# Patient Record
(demographics unavailable — no encounter records)

---

## 2024-10-17 NOTE — REASON FOR VISIT
[CV Risk Factors and Non-Cardiac Disease] : CV risk factors and non-cardiac disease [Follow-Up - Clinic] : a clinic follow-up of [Dyspnea] : dyspnea [Hyperlipidemia] : hyperlipidemia [Hypertension] : hypertension [Palpitations] : palpitations [FreeTextEntry1] : This is a 66 year year old female here today for follow up cardiac followup evaluation. The patient had COVID-19 infection September 26 after going on a river cruise.  She still complaining of nonproductive cough and some shortness of breath. She has a past medical history significant for hypertension, palpitations, status post recent knee injury.  She remains on Amlodipine 5mg PO DAILY, Rosuvastatin 20mg PO DAILY, Fenofibrate 160mg PO DAILY, Vascepa, Labetalol 200mg PO BID, and Telmisartan HCTZ 80/25mg PO DAILY. She denies fever, chills, weight loss, malaise, rash, alteration bowel habits, weakness, abdominal  pain, bloating, changes in urination, visual disturbances, chest pain, headaches, dizziness, heart palpitations, recent episodes of syncope or falls at this time.

## 2024-10-17 NOTE — DISCUSSION/SUMMARY
[FreeTextEntry1] : This is a 66 year-old female with past medical history significant for status post COVID-19 infection September 26, 2024 after completing a river cruise hypertension, palpitations, status post recent knee injury, who comes in for followup cardiac evaluation.  She is still complaining of shortness of breath, loss of taste and smell.  She denies chest pain, dizziness or syncope. Electrocardiogram done October 17, 2024 demonstrated normal sinus rhythm rate 74 bpm is otherwise remarkable for poor R wave progression and nonspecific ST wave changes. Chest x-ray done October 4, 2024 demonstrated no evidence for pneumonia. Blood work done Tober 15 2024 demonstrated white blood cell count of 6.9. I recommend the patient make an appoint with pulmonary medicine to evaluate her shortness of breath and rule out postinflammatory COVID-19 effects. She is instructed follow-up with her primary care physician. She is currently hemodynamically stable and asymptomatic from cardiac standpoint. She is considering abdominoplasty and January 2024.  Blood work done August 2, 2023 demonstrated an LDL direct 59 mg/dL, AST of 71, ALT of 88,.  Hemoglobin A1c of 5.8 the patient reports that she had blood work done after having some alcoholic beverages. The patient had a normal exercise stress test August 19, 2021. Echo Doppler examination done July 21, 2023 demonstrated mild mitral valve regurgitation, mild tricuspid valve regurgitation, mild left ventricular hypertrophy, thinning of the interatrial septum, left atrial enlargement and normal ejection fraction of 64%. The patient will follow-up blood work in 2 months.  I have instructed her to limit her alcohol intake. She will follow-up with her primary care physician to discuss that in greater detail. She will have new blood work for lipid panel and SMA-20 later today. The patient is stable from cardiac standpoint. She will limit her ETOH intake and new blood work was done today.  PMH: She was recently seen in the hospital emergency room with an elevated blood pressure of over 170 mm.  She then realized she was not taking her medications on a regular basis.  She now understands when she should be taking her medications.  She is currently on Micardis hydrochlorothiazide 80/25 mg in the morning, amlodipine 5 mg in the morning, Bystolic 10 mg in the evening, in addition to her Crestor 20 mg per day. Electrocardiogram done September November 20, 2019 demonstrated normal sinus rhythm rate 70 beats per minute is otherwise unremarkable. Electrocardiogram done 1/2/2019,-- normal sinus rhythm at a rate of 66 beats per minute is otherwise remarkable for left atrial abnormality.   The patient understands that aerobic exercises must be increased to 40 minutes 4 times per week. A detailed discussion of lifestyle modification was done today. The patient has a good understanding of the diagnosis, and treatment plan. Lifestyle modification was also outlined.

## 2024-10-17 NOTE — PHYSICAL EXAM
[Well Developed] : well developed [Well Nourished] : well nourished [No Acute Distress] : no acute distress [Normal Venous Pressure] : normal venous pressure [No Carotid Bruit] : no carotid bruit [Normal S1, S2] : normal S1, S2 [No Murmur] : no murmur [No Rub] : no rub [Clear Lung Fields] : clear lung fields [Good Air Entry] : good air entry [No Respiratory Distress] : no respiratory distress  [Soft] : abdomen soft [Non Tender] : non-tender [No Masses/organomegaly] : no masses/organomegaly [Normal Bowel Sounds] : normal bowel sounds [Normal Gait] : normal gait [No Edema] : no edema [No Cyanosis] : no cyanosis [No Clubbing] : no clubbing [No Varicosities] : no varicosities [No Rash] : no rash [No Skin Lesions] : no skin lesions [Moves all extremities] : moves all extremities [No Focal Deficits] : no focal deficits [Normal Speech] : normal speech [Alert and Oriented] : alert and oriented [Normal memory] : normal memory [General Appearance - Well Developed] : well developed [Normal Appearance] : normal appearance [Well Groomed] : well groomed [General Appearance - Well Nourished] : well nourished [No Deformities] : no deformities [General Appearance - In No Acute Distress] : no acute distress [Normal Conjunctiva] : the conjunctiva exhibited no abnormalities [Normal Oral Mucosa] : normal oral mucosa [Normal Jugular Venous A Waves Present] : normal jugular venous A waves present [Normal Jugular Venous V Waves Present] : normal jugular venous V waves present [No Jugular Venous Simpson A Waves] : no jugular venous simpson A waves [Respiration, Rhythm And Depth] : normal respiratory rhythm and effort [Exaggerated Use Of Accessory Muscles For Inspiration] : no accessory muscle use [Auscultation Breath Sounds / Voice Sounds] : lungs were clear to auscultation bilaterally [Bowel Sounds] : normal bowel sounds [Abdomen Soft] : soft [Abnormal Walk] : normal gait [Nail Clubbing] : no clubbing of the fingernails [Cyanosis, Localized] : no localized cyanosis [Skin Color & Pigmentation] : normal skin color and pigmentation [Skin Turgor] : normal skin turgor [] : no rash [Oriented To Time, Place, And Person] : oriented to person, place, and time [Impaired Insight] : insight and judgment were intact [Affect] : the affect was normal [Mood] : the mood was normal [No Anxiety] : not feeling anxious [5th Left ICS - MCL] : palpated at the 5th LICS in the midclavicular line [Normal] : normal [No Precordial Heave] : no precordial heave was noted [Normal Rate] : normal [Rhythm Regular] : regular [Normal S1] : normal S1 [Normal S2] : normal S2 [No Gallop] : no gallop heard [I] : a grade 1 [2+] : left 2+ [No Abnormalities] : the abdominal aorta was not enlarged and no bruit was heard [No Pitting Edema] : no pitting edema present [Apical Thrill] : no thrill palpable at the apex [S3] : no S3 [S4] : no S4 [Click] : no click [Pericardial Rub] : no pericardial rub

## 2024-10-17 NOTE — ASSESSMENT
[FreeTextEntry1] : This is a 64 year year old female here today for follow up cardiac followup evaluation.  She has a past medical history significant for  hypertension, palpitations, status post recent knee injury.  Today she is feeling generally well and does not have any complaints at this time.  She remains on Amlodipine 5mg PO DAILY, Rosuvastatin 20mg PO DAILY, Fenofibrate 160mg PO DAILY, Vascepa, Labetalol 200mg PO BID, and Telmisartan HCTZ 80/25mg PO DAILY.  She would like to make a note that she did forget to take her medication this morning and she also just came back from [place]   She denies fever, chills, weight loss, malaise, rash, alteration bowel habits, weakness, abdominal  pain, bloating, changes in urination, visual disturbances, chest pain, headaches, dizziness, heart palpitations, recent episodes of syncope or falls at this time.  BLOOD PRESSURE: -BP is borderline elevated on today's exam but she did not take her medication today.  -I have discussed the importance of maintaining good BP control and reviewed the newest guidelines with the patient while re-enforcing dietary sodium restrictions to no more than 2-3 g daily, DASH diet, life style modifications as well as the goal of maintaining ideal body weight with the patient today. I have advised the patient to avoid the use of over-the-counter medications/ supplements especially NSAIDS.  I have reviewed with Ms. GARCIA that serious health consequences can occur when blood pressure is not well controlled and the need for strict compliance with medication and that optimal control can significantly reduce the risk of cardiovascular disease stroke, heart attack and other organ damage. They verbally expressed understanding of the fore mentioned serious health consequences to me today.  BLOOD WORK: -New blood work was done today, 05/23/2022 to evaluate lipid profile, CBC, BMP, hepatic function, A1C and TSH.  CHOLESTEROL CONTROL: -Patient will continue the advised  TLC diet and to continue follow-up for treatment of hyperlipidemia and repeat blood testing with diet and exercise. I have discussed different exercises and the importance of maintenance of optimal body weight. The importance of staying within guidelines and recommendations was stressed to the patient today and they acknowledged that they understand this to me verbally.   -Ms. GARCIA was educated and advised that failure to follow-up with my medical recommendations to lower cholesterol can result in severe health consequences therefore, they will continuing a low saturated and low fat diet and to avoid excessive carbohydrates to help reduce triglycerides and that lowering LDL levels is associated with a significant decrease in serious cardiac events including but not limited to heart attack stroke and overall death. We will continue lipid lowering agents as advised based on blood test results and the patient understands to call if they develop severe muscle discomfort or if they have a reddish tinted discoloration in their urine.  TESTING/REPORTS: -EKG done May 23, 2022 which demonstrated regular sinus rhythm with nonspecific ST-T wave changes, BPM of 69.  -Calcium score done on Rahat 3 2020 demonstrated 55.   -EKG done 2/26/2021 which demonstrated regular sinus rhythm with nonspecific ST-T wave changes, BPM of 63  -Echocardiogram done 8/19/22 demonstrated mild TR and OH with thinning and dyskinesis of the inter-atrial septum with normal left ventricular systolic function. EF of 67%.   PLAN: -She will continue with her usual medications and will contact the office if she is having any complaints between now and their next follow up appointment. -She promises to be more compliant with her medications. -New blood work was done today, 05/23/2022  to evaluate lipid profile, CBC, BMP, hepatic function, A1C and TSH.   I have discussed the plan of care with Ms. AME GARCIA  and she  will follow up in 3 months.   The patient understands that aerobic exercises must be increased to minutes 4 times/week and a detailed discussion of lifestyle modification was done today.  The patient has a good understanding of the diagnosis, treatment plan and lifestyle modification.  She will contact me at the office for any questions with their care or any changes in their health status.   Michael WAHL

## 2024-11-26 NOTE — HISTORY OF PRESENT ILLNESS
[Former] : former [TextBox_4] : 1111 66-year-old patient Chief complain shortness of breath exertional dyspnea She states she is status post COVID-19 infection  past September 2024 while on a trip overseas in [place] She has had prior shortness of breath but states since the COVID infection it had progressed significantly Although she does state that she has had  very recently some clinical improvement She is also felt that she has had some shortness of breath at rest with walking  no wheeze No purulent sputum No history of COPD asthma bronchitis emphysema interstitial lung [id number] History obstructive sleep apnea with an oral appliance She states a prior chest x-ray in October was clear There is no pleuritic chest pain Positive extensive tobacco history 50-year pack history of close discontinued all tobacco since 2004 [TextBox_11] : 2 1/2 [YearQuit] : 2004 [TextBox_29] : 50 year pack history with tobacco x  20   years

## 2024-11-26 NOTE — DISCUSSION/SUMMARY
[FreeTextEntry1] : Shortness of breath/exertional shortness of breath Risk factors for pulmonary embolism including post COVID and post overseas flying to [place] rule out pulmonary embolism although low clinical suspicion Status post COVID-19 infection Obstructive sleep apnea on oral appliance Borderline NIOX Pulmonary physiology otherwise normal No evidence for exercise-induced hypoxemia Chest x-ray imaging unremarkable Await D-dimer and renal function If D-dimer is significantly elevated CT chest angiogram protocol Otherwise discussed with patient noncontrast CT chest to make sure were not missing any underlying COVID induced pulmonary parenchymal disease Rule out more subtle interstitial lung disease in a patient with connective tissue disorder rheumatoid arthritis

## 2024-11-26 NOTE — HISTORY OF PRESENT ILLNESS
[Former] : former [TextBox_4] : 1111 66-year-old patient Chief complain shortness of breath exertional dyspnea She states she is status post COVID-19 infection  past September 2024 while on a trip overseas in [place] She has had prior shortness of breath but states since the COVID infection it had progressed significantly Although she does state that she has had  very recently some clinical improvement She is also felt that she has had some shortness of breath at rest with walking  no wheeze No purulent sputum No history of COPD asthma bronchitis emphysema interstitial lung [id number] History obstructive sleep apnea with an oral appliance She states a prior chest x-ray in October was clear There is no pleuritic chest pain Positive extensive tobacco history 50-year pack history of close discontinued all tobacco since 2004 [TextBox_11] : 2 1/2 [YearQuit] : 2004 [TextBox_29] : 50 year pack history with tobacco x  20   years

## 2024-11-26 NOTE — PROCEDURE
[FreeTextEntry1] : Blood draw  Pulmonary 6 low exercise study November 20 50,024 Baseline O2 saturation 96% Initially went right up to 91% but throughout the rest of the ambulatory study O2 saturation on room air 93 to 94% with normal recovery  Spirometry November 25, 2024 Flow rates are normal No bronchodilator sponsor FEV1 Lung volumes normal No air trapping Diffusion low normal range 73% predicted Hemoglobin 14.9  Chest x-ray PA lateral November 25, 2024 Normal cardiac size Lung fields are grossly clear without parenchymal infiltrate pleural effusion dominant pulmonary nodule Soft tissue bony structures unremarkable Overall impression clear lungs  NIOX 23 upper limits of normal range November 25, 2024

## 2024-12-30 NOTE — HISTORY OF PRESENT ILLNESS
[Former] : former [TextBox_4] : 66-year-old patient Chief complain shortness of breath exertional dyspnea now  post trip [place]  still dyspnea  pos  air line  flight  She states she is status post COVID-19 infection  past September 2024 while on a trip overseas in [place] She has had prior shortness of breath but states since the COVID infection it had progressed significantly Although she does state that she has had  very recently some clinical improvement She is also felt that she has had some shortness of breath at rest with walking  no wheeze No purulent sputum No history of COPD asthma bronchitis emphysema interstitial lung [id number] History obstructive sleep apnea with an oral appliance She states a prior chest x-ray in October was clear There is no pleuritic chest pain Positive extensive tobacco history 50-year pack history of close discontinued all tobacco since 2004 [TextBox_11] : 2 1/2 [YearQuit] : 2004 [TextBox_29] : 50 year pack history with tobacco x  20   years

## 2024-12-30 NOTE — DISCUSSION/SUMMARY
[FreeTextEntry1] : Shortness of breath/exertional shortness of breath Favor  overweight  and deconditioning  Risk factors for pulmonary embolism including post COVID and post overseas flying to [place] rule out pulmonary embolism although low clinical suspicion D Dimer  negative CT  pos CA Ca++- f/u Dr Stratton  ?? ischemic  etiology Status post COVID-19 infection Obstructive sleep apnea on oral appliance Borderline NIOX Pulmonary physiology otherwise normal No evidence for exercise-induced hypoxemia Chest x-ray imaging unremarkable Await D-dimer and renal function If D-dimer is significantly elevated CT chest angiogram protocol Otherwise discussed with patient noncontrast CT chest to make sure were not missing any underlying COVID induced pulmonary parenchymal disease Rule out more subtle interstitial lung disease in a patient with connective tissue disorder rheumatoid arthritis neg on CT CHEST  JEAN CLAUDE on  oral appliance

## 2024-12-30 NOTE — PROCEDURE
[FreeTextEntry1] : blood draw  CT chest 12/10/2024 Lungs clear Punctate calcified granuloma right lower lobe Pleural mediastinum negative It is atherosclerotic calcification of coronary artery vessels Small hiatal hernia Overall impression no acute pleural or parenchymal pulmonary abnormalities Recommend routine cardiology follow-up   Pulmonary 6 low exercise study November 20 50,024 Baseline O2 saturation 96% Initially went right up to 91% but throughout the rest of the ambulatory study O2 saturation on room air 93 to 94% with normal recovery  Spirometry November 25, 2024 Flow rates are normal No bronchodilator sponsor FEV1 Lung volumes normal No air trapping Diffusion low normal range 73% predicted Hemoglobin 14.9  Chest x-ray PA lateral November 25, 2024 Normal cardiac size Lung fields are grossly clear without parenchymal infiltrate pleural effusion dominant pulmonary nodule Soft tissue bony structures unremarkable Overall impression clear lungs  NIOX 23 upper limits of normal range November 25, 2024   Interpreting Physician: Fernando Stratton MD Primary Sonographer:    Israel Avelar  Indication(s):     Essential (primary) hypertension - I10; Other forms of dyspnea - R06.09; Cardiac murmur, unspecified - R01.1; Palpitations - R00.2 Procedure:         Transthoracic echocardiogram with 2-D, M-mode and complete spectral and color flow Doppler. Ordering Location: [facility] Admission Status:  Outpatient Study Information: Image quality for this study is good.  _______________________________________________________________________________________ CONCLUSIONS:  1. The left ventricular systolic function is normal with an ejection fraction of 64 % by visual interpretation visually estimated at 60 to 65 %. 2. The left atrium is mildly dilated in size. 3. Mild mitral regurgitation. 4. Mild tricuspid regurgitation. 5. Mild left ventricular hypertrophy. 6. Thinning of the inter-atrial septum.

## 2025-02-14 NOTE — DISCUSSION/SUMMARY
[FreeTextEntry1] : Shortness of breath/exertional shortness of breath Status post plastic surgery abdominoplasty required hospitalization fourth hypoxemia without interval improvement Hospital course as noted above Underwent CT angiogram no evidence for pulmonary embolism Cardiac evaluation along with elevated BNP did not feel there was any evidence of cardiac disease accounting for this CT angiogram protocol did not demonstrate bilateral mid to lower lung zone atelectasis which can account for hypoxemia. Pulmonary 6-minute walk exercise study did not demonstrate desaturation She is also status post RSV bronchiolitis Likely these multiple factors contributing to the hypoxemia Will reevaluate patient 1 month Additional workup can include a nuclear shunt study ordered for nuclear medicine Will also consider cardiopulmonary exercise stress test Ordered high-altitude simulation test as patient is planning to fly Repeat CT chest with 1 month follow-up Follow-up cardiology  Favor  overweight  and deconditioning  Risk factors for pulmonary embolism including post COVID and post overseas flying to [facility] rule out pulmonary embolism although low clinical suspicion D Dimer  negative CT  pos CA Ca++- f/u Dr Stratton  ?? ischemic  etiology Status post COVID-19 infection Obstructive sleep apnea on oral appliance Borderline NIOX has normalized Pulmonary physiology otherwise normal No evidence for exercise-induced hypoxemia Chest x-ray imaging unremarkable Await D-dimer and renal function If D-dimer is significantly elevated CT chest angiogram protocol Otherwise discussed with patient noncontrast CT chest to make sure were not missing any underlying COVID induced pulmonary parenchymal disease Rule out more subtle interstitial lung disease in a patient with connective tissue disorder rheumatoid arthritis neg on CT CHEST  JEAN CLAUDE on  oral appliance

## 2025-02-14 NOTE — REVIEW OF SYSTEMS
[Negative] : Hematologic [TextBox_30] : HPI [TextBox_44] : Hyperlipidemia, heart murmur [TextBox_69] : History of hiatal hernia [TextBox_94] : Osteoarthritis, rheumatoid arthritis

## 2025-02-14 NOTE — PROCEDURE
[FreeTextEntry1] : PFT February 14, 2025 Flow rates normal range FVC 76% predicted Total lung capacity normal range 82% predicted No air trapping Diffusion normal range 81% predicted Hemoglobin 15.8 Noted data comparison to study of 11/25/2024 there is similar reduction at the FVC and total lung capacity with no interval change at the diffusion  NIOX 18 normal range with no evidence for airway inflammation February 14, 2025  Pulmonary 6-minute walk exercise study February 14, 2025 Baseline room air O2 saturation 94% Normal study Patient does have low normal oxygen levels 90 to 93% during the procedure At recovery she was 95% No evidence of exercise-induced hypoxemia No indication based on this study for portable oxygen therapy  Chest x-ray PA lateral February 14, 2025 Heart size is normal Mild elevation right hemidiaphragm Lung fields are clear No parenchymal infiltrate pleural effusion or dominant pulmonary nodule Soft tissue bony structures unremarkable No appreciable pleural effusion There is some very minimal atelectatic change in the right lower base compared to the chest x-ray 11/25/2024  CT chest 12/10/2024 Lungs clear Punctate calcified granuloma right lower lobe Pleural mediastinum negative It is atherosclerotic calcification of coronary artery vessels Small hiatal hernia Overall impression no acute pleural or parenchymal pulmonary abnormalities Recommend routine cardiology follow-up   Pulmonary 6 low exercise study November 20 50,024 Baseline O2 saturation 96% Initially went right up to 91% but throughout the rest of the ambulatory study O2 saturation on room air 93 to 94% with normal recovery  Spirometry November 25, 2024 Flow rates are normal No bronchodilator sponsor FEV1 Lung volumes normal No air trapping Diffusion low normal range 73% predicted Hemoglobin 14.9  Chest x-ray PA lateral November 25, 2024 Normal cardiac size Lung fields are grossly clear without parenchymal infiltrate pleural effusion dominant pulmonary nodule Soft tissue bony structures unremarkable Overall impression clear lungs  NIOX 23 upper limits of normal range November 25, 2024   Interpreting Physician: Fernando Stratton MD Primary Sonographer:    Israel Avelar  Indication(s):     Essential (primary) hypertension - I10; Other forms of dyspnea - R06.09; Cardiac murmur, unspecified - R01.1; Palpitations - R00.2 Procedure:         Transthoracic echocardiogram with 2-D, M-mode and complete spectral and color flow Doppler. Ordering Location: [facility] Admission Status:  Outpatient Study Information: Image quality for this study is good.  _______________________________________________________________________________________ CONCLUSIONS:  1. The left ventricular systolic function is normal with an ejection fraction of 64 % by visual interpretation visually estimated at 60 to 65 %. 2. The left atrium is mildly dilated in size. 3. Mild mitral regurgitation. 4. Mild tricuspid regurgitation. 5. Mild left ventricular hypertrophy. 6. Thinning of the inter-atrial septum.

## 2025-03-05 NOTE — PHYSICAL EXAM
[Well Developed] : well developed [Well Nourished] : well nourished [No Acute Distress] : no acute distress [Normal Venous Pressure] : normal venous pressure [No Carotid Bruit] : no carotid bruit [Normal S1, S2] : normal S1, S2 [No Murmur] : no murmur [No Rub] : no rub [Clear Lung Fields] : clear lung fields [Good Air Entry] : good air entry [No Respiratory Distress] : no respiratory distress  [Soft] : abdomen soft [Non Tender] : non-tender [No Masses/organomegaly] : no masses/organomegaly [Normal Bowel Sounds] : normal bowel sounds [Normal Gait] : normal gait [No Edema] : no edema [No Cyanosis] : no cyanosis [No Clubbing] : no clubbing [No Varicosities] : no varicosities [No Rash] : no rash [No Skin Lesions] : no skin lesions [Moves all extremities] : moves all extremities [No Focal Deficits] : no focal deficits [Normal Speech] : normal speech [Alert and Oriented] : alert and oriented [Normal memory] : normal memory [Normal Appearance] : normal appearance [General Appearance - Well Developed] : well developed [Well Groomed] : well groomed [General Appearance - Well Nourished] : well nourished [No Deformities] : no deformities [General Appearance - In No Acute Distress] : no acute distress [Normal Conjunctiva] : the conjunctiva exhibited no abnormalities [Normal Oral Mucosa] : normal oral mucosa [Normal Jugular Venous A Waves Present] : normal jugular venous A waves present [Normal Jugular Venous V Waves Present] : normal jugular venous V waves present [No Jugular Venous Simpson A Waves] : no jugular venous simpson A waves [Respiration, Rhythm And Depth] : normal respiratory rhythm and effort [Exaggerated Use Of Accessory Muscles For Inspiration] : no accessory muscle use [Auscultation Breath Sounds / Voice Sounds] : lungs were clear to auscultation bilaterally [Bowel Sounds] : normal bowel sounds [Abdomen Soft] : soft [Nail Clubbing] : no clubbing of the fingernails [Abnormal Walk] : normal gait [Cyanosis, Localized] : no localized cyanosis [Skin Color & Pigmentation] : normal skin color and pigmentation [Skin Turgor] : normal skin turgor [] : no rash [Impaired Insight] : insight and judgment were intact [Oriented To Time, Place, And Person] : oriented to person, place, and time [Affect] : the affect was normal [Mood] : the mood was normal [No Anxiety] : not feeling anxious [5th Left ICS - MCL] : palpated at the 5th LICS in the midclavicular line [Normal] : normal [No Precordial Heave] : no precordial heave was noted [Apical Thrill] : no thrill palpable at the apex [Normal Rate] : normal [Rhythm Regular] : regular [Normal S1] : normal S1 [Normal S2] : normal S2 [No Gallop] : no gallop heard [S3] : no S3 [S4] : no S4 [Click] : no click [Pericardial Rub] : no pericardial rub [I] : a grade 1 [No Abnormalities] : the abdominal aorta was not enlarged and no bruit was heard [2+] : left 2+ [No Pitting Edema] : no pitting edema present

## 2025-03-05 NOTE — REASON FOR VISIT
[CV Risk Factors and Non-Cardiac Disease] : CV risk factors and non-cardiac disease [FreeTextEntry1] : This is a 66 year year old female here today for follow up cardiac followup evaluation. The patient had COVID-19 infection September 26 after going on a river cruise.  She still complaining of nonproductive cough and some shortness of breath. She has a past medical history significant for hypertension, palpitations, status post recent knee injury.  She remains on Amlodipine 5mg PO DAILY, Rosuvastatin 20mg PO DAILY, Fenofibrate 160mg PO DAILY, Vascepa, Labetalol 200mg PO BID, and Telmisartan HCTZ 80/25mg PO DAILY. She denies fever, chills, weight loss, malaise, rash, alteration bowel habits, weakness, abdominal  pain, bloating, changes in urination, visual disturbances, chest pain, headaches, dizziness, heart palpitations, recent episodes of syncope or falls at this time.    [Follow-Up - Clinic] : a clinic follow-up of [Dyspnea] : dyspnea [Hyperlipidemia] : hyperlipidemia [Hypertension] : hypertension [Palpitations] : palpitations

## 2025-03-05 NOTE — DISCUSSION/SUMMARY
[FreeTextEntry1] : This is a 66 year-old female with past medical history significant for status post COVID-19 infection September 26, 2024 after completing a river cruise hypertension, palpitations, status post recent knee injury, who comes in for followup cardiac evaluation.  The patient went for a ventral hernia repair and tummy tuck January 8, 2025 done at an ambulatory surgical location.  Her oxygen saturation dropped to 80% and she was transferred to [facility] where she remained for 5 days.  She reports "nothing was found"; she has been evaluated by her pulmonologist, Dr. Glass. She denies chest pain, shortness of breath, dizziness or syncope. Electrocardiogram done March 5, 2025 demonstrate normal sinus rhythm rate 68 bpm is otherwise unremarkable. Echo Doppler examination done at [facility] January 11, 2025 demonstrated normal left ventricular ejection fraction of 70 to 75% with trace mitral valve regurgitation, trace tricuspid valve regurgitation. The patient is currently hemodynamically stable from a cardiac standpoint.  Her oxygen saturation today is 92% on room air.  She will follow-up with her primary care physician as well as her pulmonologist. She will have new blood work done today for lipid panel. She remains on fenofibrate 160 mg daily, Vascepa 2 g twice a day, Crestor 20 mg/day. She also remains on labetalol 200 mg twice a day, telmisartan hydrochlorothiazide 80/25 mg daily and amlodipine 5 mg in the evening. Electrocardiogram done October 17, 2024 demonstrated normal sinus rhythm rate 74 bpm is otherwise remarkable for poor R wave progression and nonspecific ST wave changes. Chest x-ray done October 4, 2024 demonstrated no evidence for pneumonia. She is instructed follow-up with her primary care physician. She is currently hemodynamically stable and asymptomatic from cardiac standpoint. Blood work done August 2, 2023 demonstrated an LDL direct 59 mg/dL, AST of 71, ALT of 88,.  Hemoglobin A1c of 5.8 the patient reports that she had blood work done after having some alcoholic beverages. The patient had a normal exercise stress test August 19, 2021. Echo Doppler examination done July 21, 2023 demonstrated mild mitral valve regurgitation, mild tricuspid valve regurgitation, mild left ventricular hypertrophy, thinning of the interatrial septum, left atrial enlargement and normal ejection fraction of 64%. I have instructed her to limit her alcohol intake. PMH: She was recently seen in the hospital emergency room with an elevated blood pressure of over 170 mm.  She then realized she was not taking her medications on a regular basis.  She now understands when she should be taking her medications.  She is currently on Micardis hydrochlorothiazide 80/25 mg in the morning, amlodipine 5 mg in the morning, Bystolic 10 mg in the evening, in addition to her Crestor 20 mg per day. Electrocardiogram done September November 20, 2019 demonstrated normal sinus rhythm rate 70 beats per minute is otherwise unremarkable. Electrocardiogram done 1/2/2019,-- normal sinus rhythm at a rate of 66 beats per minute is otherwise remarkable for left atrial abnormality.   The patient understands that aerobic exercises must be increased to 40 minutes 4 times per week. A detailed discussion of lifestyle modification was done today. The patient has a good understanding of the diagnosis, and treatment plan. Lifestyle modification was also outlined.

## 2025-03-05 NOTE — ASSESSMENT
[FreeTextEntry1] : This is a 64 year year old female here today for follow up cardiac followup evaluation.  She has a past medical history significant for  hypertension, palpitations, status post recent knee injury.  Today she is feeling generally well and does not have any complaints at this time.  She remains on Amlodipine 5mg PO DAILY, Rosuvastatin 20mg PO DAILY, Fenofibrate 160mg PO DAILY, Vascepa, Labetalol 200mg PO BID, and Telmisartan HCTZ 80/25mg PO DAILY.  She would like to make a note that she did forget to take her medication this morning and she also just came back from [place]   She denies fever, chills, weight loss, malaise, rash, alteration bowel habits, weakness, abdominal  pain, bloating, changes in urination, visual disturbances, chest pain, headaches, dizziness, heart palpitations, recent episodes of syncope or falls at this time.  BLOOD PRESSURE: -BP is borderline elevated on today's exam but she did not take her medication today.  -I have discussed the importance of maintaining good BP control and reviewed the newest guidelines with the patient while re-enforcing dietary sodium restrictions to no more than 2-3 g daily, DASH diet, life style modifications as well as the goal of maintaining ideal body weight with the patient today. I have advised the patient to avoid the use of over-the-counter medications/ supplements especially NSAIDS.  I have reviewed with Ms. GARCIA that serious health consequences can occur when blood pressure is not well controlled and the need for strict compliance with medication and that optimal control can significantly reduce the risk of cardiovascular disease stroke, heart attack and other organ damage. They verbally expressed understanding of the fore mentioned serious health consequences to me today.  BLOOD WORK: -New blood work was done today, 05/23/2022 to evaluate lipid profile, CBC, BMP, hepatic function, A1C and TSH.  CHOLESTEROL CONTROL: -Patient will continue the advised  TLC diet and to continue follow-up for treatment of hyperlipidemia and repeat blood testing with diet and exercise. I have discussed different exercises and the importance of maintenance of optimal body weight. The importance of staying within guidelines and recommendations was stressed to the patient today and they acknowledged that they understand this to me verbally.   -Ms. GARCIA was educated and advised that failure to follow-up with my medical recommendations to lower cholesterol can result in severe health consequences therefore, they will continuing a low saturated and low fat diet and to avoid excessive carbohydrates to help reduce triglycerides and that lowering LDL levels is associated with a significant decrease in serious cardiac events including but not limited to heart attack stroke and overall death. We will continue lipid lowering agents as advised based on blood test results and the patient understands to call if they develop severe muscle discomfort or if they have a reddish tinted discoloration in their urine.  TESTING/REPORTS: -EKG done May 23, 2022 which demonstrated regular sinus rhythm with nonspecific ST-T wave changes, BPM of 69.  -Calcium score done on Rahat 3 2020 demonstrated 55.   -EKG done 2/26/2021 which demonstrated regular sinus rhythm with nonspecific ST-T wave changes, BPM of 63  -Echocardiogram done 8/19/22 demonstrated mild TR and NY with thinning and dyskinesis of the inter-atrial septum with normal left ventricular systolic function. EF of 67%.   PLAN: -She will continue with her usual medications and will contact the office if she is having any complaints between now and their next follow up appointment. -She promises to be more compliant with her medications. -New blood work was done today, 05/23/2022  to evaluate lipid profile, CBC, BMP, hepatic function, A1C and TSH.   I have discussed the plan of care with Ms. AME GARCIA  and she  will follow up in 3 months.   The patient understands that aerobic exercises must be increased to minutes 4 times/week and a detailed discussion of lifestyle modification was done today.  The patient has a good understanding of the diagnosis, treatment plan and lifestyle modification.  She will contact me at the office for any questions with their care or any changes in their health status.   Michael WAHL

## 2025-03-14 NOTE — DISCUSSION/SUMMARY
[FreeTextEntry1] : Shortness of breath/exertional shortness of breath with a cardiopulmonary exercise stress test Low normal oxygenation O2 saturations Cardiology no reported abnormalities to explain Shunt study nuclear medicine negative  Status post plastic surgery abdominoplasty required hospitalization noted  hypoxemia without interval improvement Hospital course as noted above Underwent CT angiogram no evidence for pulmonary embolism Cardiac evaluation along with elevated BNP did not feel there was any evidence of cardiac disease accounting for this CT angiogram protocol did not demonstrate bilateral mid to lower lung zone atelectasis which can account for hypoxemia. Pulmonary 6-minute walk exercise study did not demonstrate desaturation She is also status post RSV bronchiolitis Likely these multiple factors contributing to the hypoxemia Will reevaluate patient 1 month Additional workup can include a nuclear shunt study ordered for nuclear medicine Will also consider cardiopulmonary exercise stress test Ordered high-altitude simulation test as patient is planning to fly Repeat CT chest with 1 month follow-up Follow-up cardiology  Favor  overweight  and deconditioning  Risk factors for pulmonary embolism including post COVID and post overseas flying to [facility] rule out pulmonary embolism although low clinical suspicion D Dimer  negative CT  pos CA Ca++- f/u Dr Stratton  ?? ischemic  etiology Status post COVID-19 infection Obstructive sleep apnea on oral appliance Borderline NIOX has normalized Pulmonary physiology otherwise normal No evidence for exercise-induced hypoxemia Chest x-ray imaging unremarkable Await D-dimer and renal function If D-dimer is significantly elevated CT chest angiogram protocol Otherwise discussed with patient noncontrast CT chest to make sure were not missing any underlying COVID induced pulmonary parenchymal disease Rule out more subtle interstitial lung disease in a patient with connective tissue disorder rheumatoid arthritis neg on CT CHEST  JEAN CLAUDE on  oral appliance

## 2025-03-14 NOTE — PROCEDURE
[FreeTextEntry1] : Quantitative perfusion nuclear lung scan No evidence of right-to-left shunting High-altitude simulation test March 14, 2025 Baseline O2 saturations admitted normal 94% Samy desaturation at 10 minutes 89% but with continued study no saturations less than 90% This does not qualify for oxygen therapy PFT February 14, 2025 Flow rates normal range FVC 76% predicted Total lung capacity normal range 82% predicted No air trapping Diffusion normal range 81% predicted Hemoglobin 15.8 Noted data comparison to study of 11/25/2024 there is similar reduction at the FVC and total lung capacity with no interval change at the diffusion  NIOX 18 normal range with no evidence for airway inflammation February 14, 2025  Pulmonary 6-minute walk exercise study February 14, 2025 Baseline room air O2 saturation 94% Normal study Patient does have low normal oxygen levels 90 to 93% during the procedure At recovery she was 95% No evidence of exercise-induced hypoxemia No indication based on this study for portable oxygen therapy  Chest x-ray PA lateral February 14, 2025 Heart size is normal Mild elevation right hemidiaphragm Lung fields are clear No parenchymal infiltrate pleural effusion or dominant pulmonary nodule Soft tissue bony structures unremarkable No appreciable pleural effusion There is some very minimal atelectatic change in the right lower base compared to the chest x-ray 11/25/2024  CT chest 12/10/2024 Lungs clear Punctate calcified granuloma right lower lobe Pleural mediastinum negative It is atherosclerotic calcification of coronary artery vessels Small hiatal hernia Overall impression no acute pleural or parenchymal pulmonary abnormalities Recommend routine cardiology follow-up   Pulmonary 6 low exercise study November 20 50,024 Baseline O2 saturation 96% Initially went right up to 91% but throughout the rest of the ambulatory study O2 saturation on room air 93 to 94% with normal recovery  Spirometry November 25, 2024 Flow rates are normal No bronchodilator sponsor FEV1 Lung volumes normal No air trapping Diffusion low normal range 73% predicted Hemoglobin 14.9  Chest x-ray PA lateral November 25, 2024 Normal cardiac size Lung fields are grossly clear without parenchymal infiltrate pleural effusion dominant pulmonary nodule Soft tissue bony structures unremarkable Overall impression clear lungs  NIOX 23 upper limits of normal range November 25, 2024   Interpreting Physician: Fernando Stratton MD Primary Sonographer:    Israel Avelar  Indication(s):     Essential (primary) hypertension - I10; Other forms of dyspnea - R06.09; Cardiac murmur, unspecified - R01.1; Palpitations - R00.2 Procedure:         Transthoracic echocardiogram with 2-D, M-mode and complete spectral and color flow Doppler. Ordering Location: [facility] Admission Status:  Outpatient Study Information: Image quality for this study is good.  _______________________________________________________________________________________ CONCLUSIONS:  1. The left ventricular systolic function is normal with an ejection fraction of 64 % by visual interpretation visually estimated at 60 to 65 %. 2. The left atrium is mildly dilated in size. 3. Mild mitral regurgitation. 4. Mild tricuspid regurgitation. 5. Mild left ventricular hypertrophy. 6. Thinning of the inter-atrial septum.

## 2025-03-14 NOTE — REASON FOR VISIT
[Follow-Up] : a follow-up visit [Shortness of Breath] : shortness of breath [TextBox_44] : hypoxemia

## 2025-03-14 NOTE — HISTORY OF PRESENT ILLNESS
[Former] : former [TextBox_4] : 66-year-old patient  Hospital transition of  care Pulmonary assessment [facility] evaluation Home  O2  katja > 90 % and at times  88 89  no discharge on O2  Patient is a 66y old Female who presents with a chief complaint of hypoxia after abdominoplasty JEAN CLAUDE - uses Mandibular Device appliance - sleep hygiene   Cardiology assessment [facility] evaluation Assessment and Plan: - Assessment The patient is a 66 year old female with a history of HTN, HL, RA, anxiety/depression, JEAN CLAUDE who presents with shortness of breath.  Plan: - ECG with sinus rhythm and nonspecific findings - Echo with normal LV systolic function, no significant valve issues - CTA chest with no large PE (limited for subsegmental PE) and atelectasis - BNP 2391 - Cardiac enzymes negative - No evidence of decompensated heart failure on exam or imaging - No indication for diuretics at the current time - Continue amlodipine 5 mg daily - Continue labetalol 200 mg bid - Continue rosuvastatin 20 mg daily - Incentive spirometry  Hospitalization data review Serum VAMP 2091 with normal range up to 125 Hemoglobin A1c 6.5% Basic metabolic panel Serum electrolytes normal Creatinine 0.74 CBC WBC 6.67 hemoglobin 13.7 hematocrit 38.9 Platelet count 209,000 Troponin high-sensitivity normal range January 2025 RVP panel negative for COVID influenza A influenza B RSV  Chief complain shortness of breath exertional dyspnea now  post trip [place] Holiday time  about Dec 25  2024 and PE w/u  negative  still dyspnea  pos  air line  flight  She states she is status post COVID-19 infection  past September 2024 while on a trip overseas in [facility] She has had prior shortness of breath but states since the COVID infection it had progressed significantly Although she does state that she has had  very recently some clinical improvement She is also felt that she has had some shortness of breath at rest with walking  no wheeze No purulent sputum No history of COPD asthma bronchitis emphysema interstitial lung [id number] History obstructive sleep apnea with an oral appliance She states a prior chest x-ray in October was clear There is no pleuritic chest pain Positive extensive tobacco history 50-year pack history of close discontinued all tobacco since 2004 [TextBox_11] : 2 1/2 [YearQuit] : 2004 [TextBox_29] : 50 year pack history with tobacco x  20   years

## 2025-04-23 NOTE — ASSESSMENT
[FreeTextEntry1] : Prior note nurse practitioner Keshia May 23, 2022::  This is a 64 year year old female here today for follow up cardiac followup evaluation.  She has a past medical history significant for  hypertension, palpitations, status post recent knee injury.  Today she is feeling generally well and does not have any complaints at this time.  She remains on Amlodipine 5mg PO DAILY, Rosuvastatin 20mg PO DAILY, Fenofibrate 160mg PO DAILY, Vascepa, Labetalol 200mg PO BID, and Telmisartan HCTZ 80/25mg PO DAILY.  She would like to make a note that she did forget to take her medication this morning and she also just came back from [place]   She denies fever, chills, weight loss, malaise, rash, alteration bowel habits, weakness, abdominal  pain, bloating, changes in urination, visual disturbances, chest pain, headaches, dizziness, heart palpitations, recent episodes of syncope or falls at this time.  BLOOD PRESSURE: -BP is borderline elevated on today's exam but she did not take her medication today.  -I have discussed the importance of maintaining good BP control and reviewed the newest guidelines with the patient while re-enforcing dietary sodium restrictions to no more than 2-3 g daily, DASH diet, life style modifications as well as the goal of maintaining ideal body weight with the patient today. I have advised the patient to avoid the use of over-the-counter medications/ supplements especially NSAIDS.  I have reviewed with Ms. GARCIA that serious health consequences can occur when blood pressure is not well controlled and the need for strict compliance with medication and that optimal control can significantly reduce the risk of cardiovascular disease stroke, heart attack and other organ damage. They verbally expressed understanding of the fore mentioned serious health consequences to me today.  BLOOD WORK: -New blood work was done today, 05/23/2022 to evaluate lipid profile, CBC, BMP, hepatic function, A1C and TSH.  CHOLESTEROL CONTROL: -Patient will continue the advised  TLC diet and to continue follow-up for treatment of hyperlipidemia and repeat blood testing with diet and exercise. I have discussed different exercises and the importance of maintenance of optimal body weight. The importance of staying within guidelines and recommendations was stressed to the patient today and they acknowledged that they understand this to me verbally.   -Ms. GARCIA was educated and advised that failure to follow-up with my medical recommendations to lower cholesterol can result in severe health consequences therefore, they will continuing a low saturated and low fat diet and to avoid excessive carbohydrates to help reduce triglycerides and that lowering LDL levels is associated with a significant decrease in serious cardiac events including but not limited to heart attack stroke and overall death. We will continue lipid lowering agents as advised based on blood test results and the patient understands to call if they develop severe muscle discomfort or if they have a reddish tinted discoloration in their urine.  TESTING/REPORTS: -EKG done May 23, 2022 which demonstrated regular sinus rhythm with nonspecific ST-T wave changes, BPM of 69.  -Calcium score done on Rahat 3 2020 demonstrated 55.   -EKG done 2/26/2021 which demonstrated regular sinus rhythm with nonspecific ST-T wave changes, BPM of 63  -Echocardiogram done 8/19/22 demonstrated mild TR and CA with thinning and dyskinesis of the inter-atrial septum with normal left ventricular systolic function. EF of 67%.   PLAN: -She will continue with her usual medications and will contact the office if she is having any complaints between now and their next follow up appointment. -She promises to be more compliant with her medications. -New blood work was done today, 05/23/2022  to evaluate lipid profile, CBC, BMP, hepatic function, A1C and TSH.   I have discussed the plan of care with Ms. AME GARCIA  and she  will follow up in 3 months.   The patient understands that aerobic exercises must be increased to minutes 4 times/week and a detailed discussion of lifestyle modification was done today.  The patient has a good understanding of the diagnosis, treatment plan and lifestyle modification.  She will contact me at the office for any questions with their care or any changes in their health status.   Michael WAHL

## 2025-04-23 NOTE — DISCUSSION/SUMMARY
[FreeTextEntry1] : This is a 66 year-old female with past medical history significant for status post COVID-19 infection September 26, 2024 after completing a river cruise hypertension, palpitations, status post recent knee injury, who comes in for followup cardiac evaluation.  The patient went for a ventral hernia repair and tummy tuck January 8, 2025 done at an ambulatory surgical location.  Her oxygen saturation dropped to 80% and she was transferred to [facility] where she remained for 5 days.  She reports "nothing was found"; she has been evaluated by her pulmonologist, Dr. Glass. She is still complaining of shortness of breath and concerned about her reduced O2 saturation levels. I would recommend the patient undergo a left and right heart cardiac catheterization to evaluate her pulmonary pressures at rule out significant coronary artery disease. She will follow-up with me after the procedure is completed and she is instructed follow-up with a pulmonologist. She denies chest pain, shortness of breath, dizziness or syncope. Electrocardiogram done March 5, 2025 demonstrate normal sinus rhythm rate 68 bpm is otherwise unremarkable. Echo Doppler examination done at [facility] January 11, 2025 demonstrated normal left ventricular ejection fraction of 70 to 75% with trace mitral valve regurgitation, trace tricuspid valve regurgitation. The patient is currently hemodynamically stable from a cardiac standpoint.  Her oxygen saturation today is 92% on room air.  She will follow-up with her primary care physician as well as her pulmonologist. She will have new blood work done today for lipid panel. She remains on fenofibrate 160 mg daily, Vascepa 2 g twice a day, Crestor 20 mg/day. She also remains on labetalol 200 mg twice a day, telmisartan hydrochlorothiazide 80/25 mg daily and amlodipine 5 mg in the evening. Electrocardiogram done October 17, 2024 demonstrated normal sinus rhythm rate 74 bpm is otherwise remarkable for poor R wave progression and nonspecific ST wave changes. Chest x-ray done October 4, 2024 demonstrated no evidence for pneumonia. She is instructed follow-up with her primary care physician. She is currently hemodynamically stable and asymptomatic from cardiac standpoint. Blood work done August 2, 2023 demonstrated an LDL direct 59 mg/dL, AST of 71, ALT of 88,.  Hemoglobin A1c of 5.8 the patient reports that she had blood work done after having some alcoholic beverages. The patient had a normal exercise stress test August 19, 2021. Echo Doppler examination done July 21, 2023 demonstrated mild mitral valve regurgitation, mild tricuspid valve regurgitation, mild left ventricular hypertrophy, thinning of the interatrial septum, left atrial enlargement and normal ejection fraction of 64%. I have instructed her to limit her alcohol intake. PMH: She was recently seen in the hospital emergency room with an elevated blood pressure of over 170 mm.  She then realized she was not taking her medications on a regular basis.  She now understands when she should be taking her medications.  She is currently on Micardis hydrochlorothiazide 80/25 mg in the morning, amlodipine 5 mg in the morning, Bystolic 10 mg in the evening, in addition to her Crestor 20 mg per day. Electrocardiogram done September November 20, 2019 demonstrated normal sinus rhythm rate 70 beats per minute is otherwise unremarkable. Electrocardiogram done 1/2/2019,-- normal sinus rhythm at a rate of 66 beats per minute is otherwise remarkable for left atrial abnormality.   The patient understands that aerobic exercises must be increased to 40 minutes 4 times per week. A detailed discussion of lifestyle modification was done today. The patient has a good understanding of the diagnosis, and treatment plan. Lifestyle modification was also outlined.

## 2025-04-23 NOTE — DISCUSSION/SUMMARY
[FreeTextEntry1] : This is a 66 year-old female with past medical history significant for status post COVID-19 infection September 26, 2024 after completing a river cruise hypertension, palpitations, status post recent knee injury, who comes in for followup cardiac evaluation.  The patient went for a ventral hernia repair and tummy tuck January 8, 2025 done at an ambulatory surgical location.  Her oxygen saturation dropped to 80% and she was transferred to [facility] where she remained for 5 days.  She reports "nothing was found"; she has been evaluated by her pulmonologist, Dr. Glass. She is still complaining of shortness of breath and concerned about her reduced O2 saturation levels. I would recommend the patient undergo a left and right heart cardiac catheterization to evaluate her pulmonary pressures at rule out significant coronary artery disease. She will follow-up with me after the procedure is completed and she is instructed follow-up with a pulmonologist. She denies chest pain, shortness of breath, dizziness or syncope. Electrocardiogram done March 5, 2025 demonstrate normal sinus rhythm rate 68 bpm is otherwise unremarkable. Echo Doppler examination done at [facility] January 11, 2025 demonstrated normal left ventricular ejection fraction of 70 to 75% with trace mitral valve regurgitation, trace tricuspid valve regurgitation. The patient is currently hemodynamically stable from a cardiac standpoint.  Her oxygen saturation today is 92% on room air.  She will follow-up with her primary care physician as well as her pulmonologist. She will have new blood work done today for lipid panel. She remains on fenofibrate 160 mg daily, Vascepa 2 g twice a day, Crestor 20 mg/day. She also remains on labetalol 200 mg twice a day, telmisartan hydrochlorothiazide 80/25 mg daily and amlodipine 5 mg in the evening. Electrocardiogram done October 17, 2024 demonstrated normal sinus rhythm rate 74 bpm is otherwise remarkable for poor R wave progression and nonspecific ST wave changes. Chest x-ray done October 4, 2024 demonstrated no evidence for pneumonia. She is instructed follow-up with her primary care physician. She is currently hemodynamically stable and asymptomatic from cardiac standpoint. Blood work done August 2, 2023 demonstrated an LDL direct 59 mg/dL, AST of 71, ALT of 88,.  Hemoglobin A1c of 5.8 the patient reports that she had blood work done after having some alcoholic beverages. The patient had a normal exercise stress test August 19, 2021. Echo Doppler examination done July 21, 2023 demonstrated mild mitral valve regurgitation, mild tricuspid valve regurgitation, mild left ventricular hypertrophy, thinning of the interatrial septum, left atrial enlargement and normal ejection fraction of 64%. I have instructed her to limit her alcohol intake. PMH: She was recently seen in the hospital emergency room with an elevated blood pressure of over 170 mm.  She then realized she was not taking her medications on a regular basis.  She now understands when she should be taking her medications.  She is currently on Micardis hydrochlorothiazide 80/25 mg in the morning, amlodipine 5 mg in the morning, Bystolic 10 mg in the evening, in addition to her Crestor 20 mg per day. Electrocardiogram done September November 20, 2019 demonstrated normal sinus rhythm rate 70 beats per minute is otherwise unremarkable. Electrocardiogram done 1/2/2019,-- normal sinus rhythm at a rate of 66 beats per minute is otherwise remarkable for left atrial abnormality.   The patient understands that aerobic exercises must be increased to 40 minutes 4 times per week. A detailed discussion of lifestyle modification was done today. The patient has a good understanding of the diagnosis, and treatment plan. Lifestyle modification was also outlined.

## 2025-06-13 NOTE — DISCUSSION/SUMMARY
[FreeTextEntry1] : borderline hypoxemia r/o atelectasis with R diaphragm elevation r/o paralysis' repeat CT CHEST Fluro SNIFF test'  consider Bronchoscopy   Shortness of breath/exertional shortness of breath with a cardiopulmonary exercise stress test Low normal oxygenation O2 saturations Cardiology no reported abnormalities to explain Shunt study nuclear medicine negative  Status post plastic surgery abdominoplasty required hospitalization noted  hypoxemia without interval improvement Hospital course as noted above Underwent CT angiogram no evidence for pulmonary embolism Cardiac evaluation along with elevated BNP did not feel there was any evidence of cardiac disease accounting for this CT angiogram protocol did not demonstrate bilateral mid to lower lung zone atelectasis which can account for hypoxemia. Pulmonary 6-minute walk exercise study did not demonstrate desaturation She is also status post RSV bronchiolitis Likely these multiple factors contributing to the hypoxemia Will reevaluate patient 1 month Additional workup can include a nuclear shunt study ordered for nuclear medicine Will also consider cardiopulmonary exercise stress test Ordered high-altitude simulation test as patient is planning to fly Repeat CT chest with 1 month follow-up Follow-up cardiology  Favor  overweight  and deconditioning  Risk factors for pulmonary embolism including post COVID and post overseas flying to [facility] rule out pulmonary embolism although low clinical suspicion D Dimer  negative CT  pos CA Ca++- f/u Dr Stratton  ?? ischemic  etiology Status post COVID-19 infection Obstructive sleep apnea on oral appliance Borderline NIOX has normalized Pulmonary physiology otherwise normal No evidence for exercise-induced hypoxemia Chest x-ray imaging unremarkable Await D-dimer and renal function If D-dimer is significantly elevated CT chest angiogram protocol Otherwise discussed with patient noncontrast CT chest to make sure were not missing any underlying COVID induced pulmonary parenchymal disease Rule out more subtle interstitial lung disease in a patient with connective tissue disorder rheumatoid arthritis neg on CT CHEST  JEAN CLAUDE on  oral appliance

## 2025-06-13 NOTE — PROCEDURE
[FreeTextEntry1] : [facility] urgent care chest x-ray demonstrated elevated right hemidiaphragm document that is new dating back from an x-ray performed in the radiology site October Noted the elevation of the diaphragm was demonstrated on the CT chest and chest x-ray with hospitalization January 2025 [facility]  Cardiopulmonary exercise stress test May 28, 2025 Low anaerobic threshold suggest patient stopped due to underlying cardiac condition Deconditioning can also present a similar picture Patient has already undergone cardiac workup as noted negative with cardiac catheterization with minimal nonobstructive cardiac disease noted VQ scan did not demonstrate shunt  PFT June 13, 2025 Spirometry normal flow rates no 111 normal No significant air trapping Mild reduction diffusion with a loss of function alveolar capillary unit 64% predicted Hemoglobin 14.7  Quantitative perfusion nuclear lung scan No evidence of right-to-left shunting High-altitude simulation test March 14, 2025 Baseline O2 saturations admitted normal 94% Samy desaturation at 10 minutes 89% but with continued study no saturations less than 90% This does not qualify for oxygen therapy PFT February 14, 2025 Flow rates normal range FVC 76% predicted Total lung capacity normal range 82% predicted No air trapping Diffusion normal range 81% predicted Hemoglobin 15.8 Noted data comparison to study of 11/25/2024 there is similar reduction at the FVC and total lung capacity with no interval change at the diffusion  NIOX 18 normal range with no evidence for airway inflammation February 14, 2025  Pulmonary 6-minute walk exercise study February 14, 2025 Baseline room air O2 saturation 94% Normal study Patient does have low normal oxygen levels 90 to 93% during the procedure At recovery she was 95% No evidence of exercise-induced hypoxemia No indication based on this study for portable oxygen therapy  Chest x-ray PA lateral February 14, 2025 Heart size is normal Mild elevation right hemidiaphragm Lung fields are clear No parenchymal infiltrate pleural effusion or dominant pulmonary nodule Soft tissue bony structures unremarkable No appreciable pleural effusion There is some very minimal atelectatic change in the right lower base compared to the chest x-ray 11/25/2024  CT chest 12/10/2024 Lungs clear Punctate calcified granuloma right lower lobe Pleural mediastinum negative It is atherosclerotic calcification of coronary artery vessels Small hiatal hernia Overall impression no acute pleural or parenchymal pulmonary abnormalities Recommend routine cardiology follow-up   Pulmonary 6 low exercise study November 20 50,024 Baseline O2 saturation 96% Initially went right up to 91% but throughout the rest of the ambulatory study O2 saturation on room air 93 to 94% with normal recovery  Spirometry November 25, 2024 Flow rates are normal No bronchodilator sponsor FEV1 Lung volumes normal No air trapping Diffusion low normal range 73% predicted Hemoglobin 14.9  Chest x-ray PA lateral November 25, 2024 Normal cardiac size Lung fields are grossly clear without parenchymal infiltrate pleural effusion dominant pulmonary nodule Soft tissue bony structures unremarkable Overall impression clear lungs  NIOX 23 upper limits of normal range November 25, 2024   Interpreting Physician: Fernando Stratton MD Primary Sonographer:    Israel Avelar  Indication(s):     Essential (primary) hypertension - I10; Other forms of dyspnea - R06.09; Cardiac murmur, unspecified - R01.1; Palpitations - R00.2 Procedure:         Transthoracic echocardiogram with 2-D, M-mode and complete spectral and color flow Doppler. Ordering Location: [facility] Admission Status:  Outpatient Study Information: Image quality for this study is good.  _______________________________________________________________________________________ CONCLUSIONS:  1. The left ventricular systolic function is normal with an ejection fraction of 64 % by visual interpretation visually estimated at 60 to 65 %. 2. The left atrium is mildly dilated in size. 3. Mild mitral regurgitation. 4. Mild tricuspid regurgitation. 5. Mild left ventricular hypertrophy. 6. Thinning of the inter-atrial septum.

## 2025-06-13 NOTE — HISTORY OF PRESENT ILLNESS
[Former] : former [TextBox_4] : 67-year-old patient s/p UC visit  txed URI infectyion  Zithromax  abnl CXR  reported NEW moderate R hemidiaphrahm elevation mil;d right basilar compressive  atlectasis notede review Jan 2025 [facility] XR demonstrated same no hx chest trauma noted [facility] CXR states diaphragm not reported as  elvated diaphgram  Hospital transition of  care Pulmonary assessment [facility] evaluation Home  O2  katja > 90 % and at times  88 89  no discharge on O2  Patient is a 66y old Female who presents with a chief complaint of hypoxia after abdominoplasty JEAN CLAUDE - uses Mandibular Device appliance - sleep hygiene   Cardiology assessment [facility] evaluation Assessment and Plan: - Assessment The patient is a 66 year old female with a history of HTN, HL, RA, anxiety/depression, JEAN CLAUDE who presents with shortness of breath.  Plan: - ECG with sinus rhythm and nonspecific findings - Echo with normal LV systolic function, no significant valve issues - CTA chest with no large PE (limited for subsegmental PE) and atelectasis - BNP 2391 - Cardiac enzymes negative - No evidence of decompensated heart failure on exam or imaging - No indication for diuretics at the current time - Continue amlodipine 5 mg daily - Continue labetalol 200 mg bid - Continue rosuvastatin 20 mg daily - Incentive spirometry  Hospitalization data review Serum VAMP 2091 with normal range up to 125 Hemoglobin A1c 6.5% Basic metabolic panel Serum electrolytes normal Creatinine 0.74 CBC WBC 6.67 hemoglobin 13.7 hematocrit 38.9 Platelet count 209,000 Troponin high-sensitivity normal range January 2025 RVP panel negative for COVID influenza A influenza B RSV  Chief complain shortness of breath exertional dyspnea now  post trip [place] Holiday time  about Dec 25  2024 and PE w/u  negative  still dyspnea  pos  air line  flight  She states she is status post COVID-19 infection  past September 2024 while on a trip overseas in [facility] She has had prior shortness of breath but states since the COVID infection it had progressed significantly Although she does state that she has had  very recently some clinical improvement She is also felt that she has had some shortness of breath at rest with walking  no wheeze No purulent sputum No history of COPD asthma bronchitis emphysema interstitial lung [id number] History obstructive sleep apnea with an oral appliance She states a prior chest x-ray in October was clear There is no pleuritic chest pain Positive extensive tobacco history 50-year pack history of close discontinued all tobacco since 2004 [TextBox_11] : 2 1/2 [YearQuit] : 2004 [TextBox_29] : 50 year pack history with tobacco x  20   years

## 2025-07-07 NOTE — PROCEDURE
[FreeTextEntry1] : CT chest 6/18/2025 Elevation right hemidiaphragm Calcification coronary arteries Minimal atelectatic change No endobronchial disease   fluoroscopy sniff test to rule out paralysis of right diaphragm Addendum Sniff test fluoroscopy diaphragm June 26, 2025 Asymmetric movement of the left and right diaphragm Has decreased motion of the right diaphragm paradoxical movement during sniff Right diaphragm dysfunction Diaphragmatic paralysis As noted above CT chest completed no evidence of acute intrathoracic pathology  [facility] chest x-ray demonstrated elevated right hemidiaphragm document that is new dating back from an x-ray performed in the radiology site October Noted the elevation of the diaphragm was demonstrated on the CT chest and chest x-ray with hospitalization January 2025 [facility]  Cardiopulmonary exercise stress test May 28, 2025 Low anaerobic threshold suggest patient stopped due to underlying cardiac condition Deconditioning can also present a similar picture Patient has already undergone cardiac workup as noted negative with cardiac catheterization with minimal nonobstructive cardiac disease noted VQ scan did not demonstrate shunt  PFT June 13, 2025 Spirometry normal flow rates no 111 normal No significant air trapping Mild reduction diffusion with a loss of function alveolar capillary unit 64% predicted Hemoglobin 14.7  Quantitative perfusion nuclear lung scan No evidence of right-to-left shunting High-altitude simulation test March 14, 2025 Baseline O2 saturations admitted normal 94% Samy desaturation at 10 minutes 89% but with continued study no saturations less than 90% This does not qualify for oxygen therapy PFT February 14, 2025 Flow rates normal range FVC 76% predicted Total lung capacity normal range 82% predicted No air trapping Diffusion normal range 81% predicted Hemoglobin 15.8 Noted data comparison to study of 11/25/2024 there is similar reduction at the FVC and total lung capacity with no interval change at the diffusion  NIOX 18 normal range with no evidence for airway inflammation February 14, 2025  Pulmonary 6-minute walk exercise study February 14, 2025 Baseline room air O2 saturation 94% Normal study Patient does have low normal oxygen levels 90 to 93% during the procedure At recovery she was 95% No evidence of exercise-induced hypoxemia No indication based on this study for portable oxygen therapy  Chest x-ray PA lateral February 14, 2025 Heart size is normal Mild elevation right hemidiaphragm Lung fields are clear No parenchymal infiltrate pleural effusion or dominant pulmonary nodule Soft tissue bony structures unremarkable No appreciable pleural effusion There is some very minimal atelectatic change in the right lower base compared to the chest x-ray 11/25/2024  CT chest 12/10/2024 Lungs clear Punctate calcified granuloma right lower lobe Pleural mediastinum negative It is atherosclerotic calcification of coronary artery vessels Small hiatal hernia Overall impression no acute pleural or parenchymal pulmonary abnormalities Recommend routine cardiology follow-up   Pulmonary 6 low exercise study November 20 50,024 Baseline O2 saturation 96% Initially went right up to 91% but throughout the rest of the ambulatory study O2 saturation on room air 93 to 94% with normal recovery  Spirometry November 25, 2024 Flow rates are normal No bronchodilator sponsor FEV1 Lung volumes normal No air trapping Diffusion low normal range 73% predicted Hemoglobin 14.9  Chest x-ray PA lateral November 25, 2024 Normal cardiac size Lung fields are grossly clear without parenchymal infiltrate pleural effusion dominant pulmonary nodule Soft tissue bony structures unremarkable Overall impression clear lungs  NIOX 23 upper limits of normal range November 25, 2024   Interpreting Physician: Fernando Stratton MD Primary Sonographer:    Israel Avlear  Indication(s):     Essential (primary) hypertension - I10; Other forms of dyspnea - R06.09; Cardiac murmur, unspecified - R01.1; Palpitations - R00.2 Procedure:         Transthoracic echocardiogram with 2-D, M-mode and complete spectral and color flow Doppler. Ordering Location: [facility] Admission Status:  Outpatient Study Information: Image quality for this study is good.  _______________________________________________________________________________________ CONCLUSIONS:  1. The left ventricular systolic function is normal with an ejection fraction of 64 % by visual interpretation visually estimated at 60 to 65 %. 2. The left atrium is mildly dilated in size. 3. Mild mitral regurgitation. 4. Mild tricuspid regurgitation. 5. Mild left ventricular hypertrophy. 6. Thinning of the inter-atrial septum.

## 2025-07-07 NOTE — REASON FOR VISIT
[Follow-Up] : a follow-up visit [Shortness of Breath] : shortness of breath [TextBox_44] : hypoxemia , diaphragm dysfunction

## 2025-07-07 NOTE — HISTORY OF PRESENT ILLNESS
[Former] : former [TextBox_4] : 67-year-old patient s/p UC visit  txed URI infection  Zithromax  abnl CXR  reported NEW moderate R hemidiaphrahm elevation mild right basilar compressive  atlectasis noted review Jan 2025 [facility] XR demonstrated same no hx chest trauma noted [facility] CXR states diaphragm not reported as  elvated diaphgram  Hospital transition of  care Pulmonary assessment [facility] evaluation Home  O2  katja > 90 % and at times  88 89  no discharge on O2  Patient is a 66y old Female who presents with a chief complaint of hypoxia after abdominoplasty JEAN CLAUDE - uses Mandibular Device appliance - sleep hygiene   Cardiology assessment [facility] evaluation Assessment and Plan: - Assessment The patient is a 66 year old female with a history of HTN, HL, RA, anxiety/depression, JEAN CLAUDE who presents with shortness of breath.  Plan: - ECG with sinus rhythm and nonspecific findings - Echo with normal LV systolic function, no significant valve issues - CTA chest with no large PE (limited for subsegmental PE) and atelectasis - BNP 2391 - Cardiac enzymes negative - No evidence of decompensated heart failure on exam or imaging - No indication for diuretics at the current time - Continue amlodipine 5 mg daily - Continue labetalol 200 mg bid - Continue rosuvastatin 20 mg daily - Incentive spirometry  Hospitalization data review Serum VAMP 2091 with normal range up to 125 Hemoglobin A1c 6.5% Basic metabolic panel Serum electrolytes normal Creatinine 0.74 CBC WBC 6.67 hemoglobin 13.7 hematocrit 38.9 Platelet count 209,000 Troponin high-sensitivity normal range January 2025 RVP panel negative for COVID influenza A influenza B RSV  Chief complain shortness of breath exertional dyspnea now  post trip [place] Dec Holiday time  about Dec 25  2024 and PE w/u  negative  still dyspnea  pos  air line  flight  She states she is status post COVID-19 infection  past September 2024 while on a trip overseas in [facility] She has had prior shortness of breath but states since the COVID infection it had progressed significantly Although she does state that she has had  very recently some clinical improvement She is also felt that she has had some shortness of breath at rest with walking  no wheeze No purulent sputum No history of COPD asthma bronchitis emphysema interstitial lung [id number] History obstructive sleep apnea with an oral appliance She states a prior chest x-ray in October was clear There is no pleuritic chest pain Positive extensive tobacco history 50-year pack history of close discontinued all tobacco since 2004 [TextBox_11] : 2 1/2 [YearQuit] : 2004 [TextBox_29] : 50 year pack history with tobacco x  20   years

## 2025-07-07 NOTE — DISCUSSION/SUMMARY
[FreeTextEntry1] : f/u Pulmonary 6 MWT f/u PFT  borderline hypoxemia r/o atelectasis with R diaphragm elevation/paralysis r/o paralysis' repeat CT CHEST completed noited  no intrathoracic pathology  R diaphragm paralysis Fluro SNIFF test' confirmed consider Bronchoscopy   Shortness of breath/exertional shortness of breath with a cardiopulmonary exercise stress test Low normal oxygenation O2 saturations Cardiology no reported abnormalities to explain Shunt study nuclear medicine negative  Status post plastic surgery abdominoplasty required hospitalization noted  hypoxemia without interval improvement Hospital course as noted above Underwent CT angiogram no evidence for pulmonary embolism Cardiac evaluation along with elevated BNP did not feel there was any evidence of cardiac disease accounting for this CT angiogram protocol did not demonstrate bilateral mid to lower lung zone atelectasis which can account for hypoxemia. Pulmonary 6-minute walk exercise study did not demonstrate desaturation She is also status post RSV bronchiolitis Likely these multiple factors contributing to the hypoxemia Will reevaluate patient 1 month Additional workup can include a nuclear shunt study ordered for nuclear medicine Will also consider cardiopulmonary exercise stress test Ordered high-altitude simulation test as patient is planning to fly Repeat CT chest with 1 month follow-up Follow-up cardiology  Favor  overweight  and deconditioning  Risk factors for pulmonary embolism including post COVID and post overseas flying to [facility] rule out pulmonary embolism although low clinical suspicion D Dimer  negative CT  pos CA Ca++- f/u Dr Stratton  ?? ischemic  etiology Status post COVID-19 infection Obstructive sleep apnea on oral appliance Borderline NIOX has normalized Pulmonary physiology otherwise normal No evidence for exercise-induced hypoxemia Chest x-ray imaging unremarkable Await D-dimer and renal function If D-dimer is significantly elevated CT chest angiogram protocol Otherwise discussed with patient noncontrast CT chest to make sure were not missing any underlying COVID induced pulmonary parenchymal disease Rule out more subtle interstitial lung disease in a patient with connective tissue disorder rheumatoid arthritis neg on CT CHEST  JEAN CLAUDE on  oral appliance